# Patient Record
Sex: MALE | Race: WHITE | NOT HISPANIC OR LATINO | URBAN - METROPOLITAN AREA
[De-identification: names, ages, dates, MRNs, and addresses within clinical notes are randomized per-mention and may not be internally consistent; named-entity substitution may affect disease eponyms.]

---

## 2022-11-02 ENCOUNTER — OFFICE (OUTPATIENT)
Dept: URBAN - METROPOLITAN AREA CLINIC 105 | Facility: CLINIC | Age: 67
End: 2022-11-02

## 2022-11-02 VITALS
OXYGEN SATURATION: 97 % | HEART RATE: 44 BPM | WEIGHT: 184 LBS | DIASTOLIC BLOOD PRESSURE: 50 MMHG | SYSTOLIC BLOOD PRESSURE: 110 MMHG | HEIGHT: 69 IN

## 2022-11-02 DIAGNOSIS — I48.91 UNSPECIFIED ATRIAL FIBRILLATION: ICD-10-CM

## 2022-11-02 DIAGNOSIS — R19.4 CHANGE IN BOWEL HABIT: ICD-10-CM

## 2022-11-02 DIAGNOSIS — R10.30 LOWER ABDOMINAL PAIN, UNSPECIFIED: ICD-10-CM

## 2022-11-02 DIAGNOSIS — R93.3 ABNORMAL FINDINGS ON DIAGNOSTIC IMAGING OF OTHER PARTS OF DI: ICD-10-CM

## 2022-11-02 PROCEDURE — 99204 OFFICE O/P NEW MOD 45 MIN: CPT

## 2022-11-02 RX ORDER — SODIUM SULFATE, MAGNESIUM SULFATE, AND POTASSIUM CHLORIDE 17.75; 2.7; 2.25 G/1; G/1; G/1
TABLET ORAL
Qty: 1 | Refills: 0 | Status: ACTIVE
Start: 2022-11-02

## 2022-11-02 NOTE — SERVICEHPINOTES
Guanako Napier   is seen for an initial visit today. Patient presents for evaluation of abnormal imaging findings and altered bowel habits. Patient was seen by his PCP–Dr. Castro 10/11/2022 complaining of lower abdominal pain radiating to the rectum, worse with bowel movements. Also with clear mucus in his stool and only small bowel movements since that time. He previously had a colonoscopy 10/2014. Patient was started on a 10-day course of Augmentin. Patient has CT abdomen/pelvis 10/20/2022 which showed short segment of eccentric narrowing of the lumen of the sigmoid colon concerning for possible mass and diverticulosis.
br
brPatient states that he had some discomfort in his stomach for a few days and this increase in severity. He subsequently had 4-5 small bowel movements and at the time he was having mucus in his stool. He has been having altered bowel habits for a while including hard stool and diarrhea. He reports some improvement with treatment with Augmentin. He denies blood in stool, weight loss or fevers. The pain has resolved. He reports associated fatigue. He states that he last had a colonoscopy in 2014 with Dr. Schultz in [place] (report unavailable review). He reports having some bowel issues for several years. His fatigue has worsened over the last 2 weeks. He states that this started when he had COVID back in 8/2022. He presented to the ED at [facility] 1 month ago for acute onset of A. fib. He was started on metoprolol and Xarelto. He has been off Xarelto for the last 1.5 weeks in anticipation of a colonoscopy. He denies family history of colon cancer, inflammatory bowel disease or celiac disease. His mother had esophageal cancer.

## 2022-11-02 NOTE — SERVICENOTES
– Ordered colonoscopy for further evaluation of symptoms and abnormal GI findings
– Continue to hold your Xarelto prior to your colonoscopy
– We will obtain records from your prior colonoscopy
– We will obtain records from your recent blood work
– Will discuss your low heart rate and metoprolol with Dr. Castro

## 2022-11-07 ENCOUNTER — OFFICE (OUTPATIENT)
Dept: URBAN - METROPOLITAN AREA PATHOLOGY 24 | Facility: PATHOLOGY | Age: 67
End: 2022-11-07
Payer: MEDICARE

## 2022-11-07 ENCOUNTER — AMBULATORY SURGICAL CENTER (OUTPATIENT)
Dept: URBAN - METROPOLITAN AREA SURGERY 26 | Facility: SURGERY | Age: 67
End: 2022-11-07
Payer: MEDICARE

## 2022-11-07 DIAGNOSIS — D12.2 BENIGN NEOPLASM OF ASCENDING COLON: ICD-10-CM

## 2022-11-07 DIAGNOSIS — K62.1 RECTAL POLYP: ICD-10-CM

## 2022-11-07 DIAGNOSIS — R19.4 CHANGE IN BOWEL HABIT: ICD-10-CM

## 2022-11-07 DIAGNOSIS — R93.3 ABNORMAL FINDINGS ON DIAGNOSTIC IMAGING OF OTHER PARTS OF DI: ICD-10-CM

## 2022-11-07 DIAGNOSIS — K63.5 POLYP OF COLON: ICD-10-CM

## 2022-11-07 PROCEDURE — 88305 TISSUE EXAM BY PATHOLOGIST: CPT | Performed by: STUDENT IN AN ORGANIZED HEALTH CARE EDUCATION/TRAINING PROGRAM

## 2022-11-07 PROCEDURE — 45380 COLONOSCOPY AND BIOPSY: CPT | Mod: 59

## 2022-11-07 PROCEDURE — 45385 COLONOSCOPY W/LESION REMOVAL: CPT

## 2022-11-07 PROCEDURE — 45381 COLONOSCOPY SUBMUCOUS NJX: CPT | Mod: 51

## 2023-01-18 ENCOUNTER — OFFICE (OUTPATIENT)
Dept: URBAN - METROPOLITAN AREA CLINIC 105 | Facility: CLINIC | Age: 68
End: 2023-01-18

## 2023-01-18 VITALS
HEART RATE: 58 BPM | WEIGHT: 191 LBS | HEIGHT: 69 IN | DIASTOLIC BLOOD PRESSURE: 60 MMHG | OXYGEN SATURATION: 97 % | SYSTOLIC BLOOD PRESSURE: 122 MMHG

## 2023-01-18 DIAGNOSIS — R19.4 CHANGE IN BOWEL HABIT: ICD-10-CM

## 2023-01-18 DIAGNOSIS — Z86.010 PERSONAL HISTORY OF COLONIC POLYPS: ICD-10-CM

## 2023-01-18 DIAGNOSIS — R14.0 ABDOMINAL DISTENSION (GASEOUS): ICD-10-CM

## 2023-01-18 DIAGNOSIS — K58.9 IRRITABLE BOWEL SYNDROME WITHOUT DIARRHEA: ICD-10-CM

## 2023-01-18 PROCEDURE — 99213 OFFICE O/P EST LOW 20 MIN: CPT

## 2023-01-18 RX ORDER — LACTOBACIL 2/BIFIDO 1/S.THERMO 450B CELL
112.5 PACKET (EA) ORAL
Qty: 60 | Refills: 5 | Status: ACTIVE
Start: 2023-01-18

## 2023-01-18 NOTE — SERVICENOTES
– Take fiber supplement daily such as Citrucel, Benefiber or Fiber Choice
– Sent in prescription for probiotic supplement VSL #3, take 1 capsule daily
– Repeat colonoscopy in 3 years (11/2025)
– Follow-up as needed if you have worsening symptoms

## 2023-01-18 NOTE — SERVICEHPINOTES
Guanako Napier   is seen today for a follow-up visit.    Patient presents for follow-up of colon polyps, altered bowel habits and abdominal pain. Patient was seen in clinic 11/2/2022 for evaluation of lower abdominal pain that was worse with bowel movements and clear mucus in stool. He was previously treated by his PCP 10/2022 for possible diverticulitis with 10-day course of Augmentin. He had a CT abdomen/pelvis which showed short segment of eccentric narrowing of the lumen of the sigmoid colon concerning for possible mass and diverticulosis. Patient reported some improvement in his symptoms with Augmentin. He was complaining of worsening fatigue and his metoprolol dose was cut in half. He had a colonoscopy 11/7/2022 with 8 polyps removed (4 TAs, 4 HPs), normal terminal ileum, 10 mm area of cluster of polyps versus area of trauma that was resected (normal colon mucosa), small lipoma in an ascending colon, sigmoid diverticulosis and internal hemorrhoids. Random colon biopsies were normal and negative for microscopic colitis. He was advised to repeat his colonoscopy in 3 years (11/2025). He was advised to take a fiber supplement such as Citrucel or Benefiber.
br
brPatient states that he is doing better. He has had no further episodes of abdominal pain. He denies blood in stool or black stool. He is not feeling as fatigued after having the dose of his metoprolol decreased. Bowels are still slightly abnormal. Sometimes he has small lumps and sometimes large bowel movements. Overall his bowel habits are more regular. Also with associated bloating. He is not taking a fiber supplement.br 
normal shape